# Patient Record
Sex: FEMALE | Race: WHITE | NOT HISPANIC OR LATINO | Employment: FULL TIME | URBAN - METROPOLITAN AREA
[De-identification: names, ages, dates, MRNs, and addresses within clinical notes are randomized per-mention and may not be internally consistent; named-entity substitution may affect disease eponyms.]

---

## 2017-02-15 ENCOUNTER — HISTORICAL (OUTPATIENT)
Dept: LAB | Facility: HOSPITAL | Age: 22
End: 2017-02-15

## 2017-03-21 ENCOUNTER — HISTORICAL (OUTPATIENT)
Dept: LAB | Facility: HOSPITAL | Age: 22
End: 2017-03-21

## 2018-09-11 ENCOUNTER — HISTORICAL (OUTPATIENT)
Dept: ADMINISTRATIVE | Facility: HOSPITAL | Age: 23
End: 2018-09-11

## 2018-09-11 LAB — DEPRECATED CALCIDIOL+CALCIFEROL SERPL-MC: 35.5 NG/ML (ref 30–80)

## 2020-01-02 ENCOUNTER — HISTORICAL (OUTPATIENT)
Dept: ADMINISTRATIVE | Facility: HOSPITAL | Age: 25
End: 2020-01-02

## 2020-01-02 ENCOUNTER — HISTORICAL (OUTPATIENT)
Dept: LAB | Facility: HOSPITAL | Age: 25
End: 2020-01-02

## 2020-01-02 LAB
ABS NEUT (OLG): 5.9 X10(3)/MCL (ref 2.1–9.2)
ALBUMIN SERPL-MCNC: 4.2 GM/DL (ref 3.4–5)
ALBUMIN/GLOB SERPL: 1.62 {RATIO} (ref 1.5–2.5)
ALP SERPL-CCNC: 64 UNIT/L (ref 38–126)
ALT SERPL-CCNC: 11 UNIT/L (ref 7–52)
AST SERPL-CCNC: 17 UNIT/L (ref 15–37)
BILIRUB SERPL-MCNC: 0.3 MG/DL (ref 0.2–1)
BILIRUBIN DIRECT+TOT PNL SERPL-MCNC: 0 MG/DL (ref 0–0.5)
BILIRUBIN DIRECT+TOT PNL SERPL-MCNC: 0.3 MG/DL
BUN SERPL-MCNC: 10 MG/DL (ref 7–18)
CALCIUM SERPL-MCNC: 9.1 MG/DL (ref 8.5–10)
CHLORIDE SERPL-SCNC: 101 MMOL/L (ref 98–107)
CO2 SERPL-SCNC: 27 MMOL/L (ref 21–32)
CREAT SERPL-MCNC: 0.7 MG/DL (ref 0.6–1.3)
ERYTHROCYTE [DISTWIDTH] IN BLOOD BY AUTOMATED COUNT: 12.5 % (ref 11.5–17)
GLOBULIN SER-MCNC: 2.6 GM/DL (ref 1.2–3)
GLUCOSE SERPL-MCNC: 102 MG/DL (ref 74–106)
HBV SURFACE AG SERPL QL IA: NEGATIVE
HCT VFR BLD AUTO: 40.7 % (ref 37–47)
HGB BLD-MCNC: 13.9 GM/DL (ref 12–16)
LYMPHOCYTES # BLD AUTO: 1.8 X10(3)/MCL (ref 0.6–3.4)
LYMPHOCYTES NFR BLD AUTO: 22 % (ref 13–40)
MCH RBC QN AUTO: 31.4 PG (ref 27–31.2)
MCHC RBC AUTO-ENTMCNC: 34 GM/DL (ref 32–36)
MCV RBC AUTO: 92 FL (ref 80–94)
MONOCYTES # BLD AUTO: 0.7 X10(3)/MCL (ref 0.1–1.3)
MONOCYTES NFR BLD AUTO: 7.8 % (ref 0.1–24)
NEUTROPHILS NFR BLD AUTO: 70.2 % (ref 47–80)
PLATELET # BLD AUTO: 312 X10(3)/MCL (ref 130–400)
PMV BLD AUTO: 9.3 FL (ref 9.4–12.4)
POTASSIUM SERPL-SCNC: 4.3 MMOL/L (ref 3.5–5.1)
PROT SERPL-MCNC: 6.8 GM/DL (ref 6.4–8.2)
RBC # BLD AUTO: 4.42 X10(6)/MCL (ref 4.2–5.4)
SODIUM SERPL-SCNC: 136 MMOL/L (ref 136–145)
TSH SERPL-ACNC: 0.75 MIU/ML (ref 0.35–4.94)
WBC # SPEC AUTO: 8.4 X10(3)/MCL (ref 4.5–11.5)

## 2021-09-29 ENCOUNTER — HISTORICAL (OUTPATIENT)
Dept: ADMINISTRATIVE | Facility: HOSPITAL | Age: 26
End: 2021-09-29

## 2022-04-11 ENCOUNTER — HISTORICAL (OUTPATIENT)
Dept: ADMINISTRATIVE | Facility: HOSPITAL | Age: 27
End: 2022-04-11

## 2022-04-29 VITALS
BODY MASS INDEX: 39.88 KG/M2 | SYSTOLIC BLOOD PRESSURE: 118 MMHG | WEIGHT: 225.06 LBS | HEIGHT: 63 IN | DIASTOLIC BLOOD PRESSURE: 88 MMHG

## 2022-08-17 ENCOUNTER — HOSPITAL ENCOUNTER (EMERGENCY)
Facility: HOSPITAL | Age: 27
Discharge: HOME | End: 2022-08-17
Attending: INTERNAL MEDICINE
Payer: COMMERCIAL

## 2022-08-17 VITALS
TEMPERATURE: 98 F | DIASTOLIC BLOOD PRESSURE: 85 MMHG | HEIGHT: 64 IN | SYSTOLIC BLOOD PRESSURE: 129 MMHG | HEART RATE: 92 BPM | OXYGEN SATURATION: 100 % | WEIGHT: 220 LBS | BODY MASS INDEX: 37.56 KG/M2 | RESPIRATION RATE: 18 BRPM

## 2022-08-17 DIAGNOSIS — R55 VASOVAGAL SYNCOPE: Primary | ICD-10-CM

## 2022-08-17 DIAGNOSIS — R11.2 NON-INTRACTABLE VOMITING WITH NAUSEA, UNSPECIFIED VOMITING TYPE: ICD-10-CM

## 2022-08-17 DIAGNOSIS — R07.9 CHEST PAIN: ICD-10-CM

## 2022-08-17 LAB
ALBUMIN SERPL-MCNC: 3.6 GM/DL (ref 3.5–5)
ALBUMIN/GLOB SERPL: 1.2 RATIO (ref 1.1–2)
ALP SERPL-CCNC: 68 UNIT/L (ref 40–150)
ALT SERPL-CCNC: 15 UNIT/L (ref 0–55)
APPEARANCE UR: CLEAR
AST SERPL-CCNC: 17 UNIT/L (ref 5–34)
B-HCG SERPL QL: NEGATIVE
BASOPHILS # BLD AUTO: 0.05 X10(3)/MCL (ref 0–0.2)
BASOPHILS NFR BLD AUTO: 0.5 %
BILIRUB UR QL STRIP.AUTO: NEGATIVE MG/DL
BILIRUBIN DIRECT+TOT PNL SERPL-MCNC: 0.4 MG/DL
BNP BLD-MCNC: 33.1 PG/ML
BUN SERPL-MCNC: 9.5 MG/DL (ref 7–18.7)
CALCIUM SERPL-MCNC: 9 MG/DL (ref 8.4–10.2)
CHLORIDE SERPL-SCNC: 109 MMOL/L (ref 98–107)
CO2 SERPL-SCNC: 24 MMOL/L (ref 22–29)
COLOR UR AUTO: YELLOW
CREAT SERPL-MCNC: 0.65 MG/DL (ref 0.55–1.02)
EOSINOPHIL # BLD AUTO: 0.09 X10(3)/MCL (ref 0–0.9)
EOSINOPHIL NFR BLD AUTO: 0.9 %
ERYTHROCYTE [DISTWIDTH] IN BLOOD BY AUTOMATED COUNT: 13 % (ref 11.5–17)
GFR SERPLBLD CREATININE-BSD FMLA CKD-EPI: >60 MLS/MIN/1.73/M2
GLOBULIN SER-MCNC: 2.9 GM/DL (ref 2.4–3.5)
GLUCOSE SERPL-MCNC: 78 MG/DL (ref 74–100)
GLUCOSE UR QL STRIP.AUTO: NEGATIVE MG/DL
HCT VFR BLD AUTO: 38.3 % (ref 37–47)
HGB BLD-MCNC: 12.4 GM/DL (ref 12–16)
IMM GRANULOCYTES # BLD AUTO: 0.04 X10(3)/MCL (ref 0–0.04)
IMM GRANULOCYTES NFR BLD AUTO: 0.4 %
INR BLD: 1.08 (ref 2–3)
KETONES UR QL STRIP.AUTO: NEGATIVE MG/DL
LEUKOCYTE ESTERASE UR QL STRIP.AUTO: NEGATIVE UNIT/L
LIPASE SERPL-CCNC: 33 U/L
LYMPHOCYTES # BLD AUTO: 2.86 X10(3)/MCL (ref 0.6–4.6)
LYMPHOCYTES NFR BLD AUTO: 27.3 %
MAGNESIUM SERPL-MCNC: 2 MG/DL (ref 1.6–2.6)
MCH RBC QN AUTO: 29.7 PG (ref 27–31)
MCHC RBC AUTO-ENTMCNC: 32.4 MG/DL (ref 33–36)
MCV RBC AUTO: 91.8 FL (ref 80–94)
MONOCYTES # BLD AUTO: 0.72 X10(3)/MCL (ref 0.1–1.3)
MONOCYTES NFR BLD AUTO: 6.9 %
NEUTROPHILS # BLD AUTO: 6.7 X10(3)/MCL (ref 2.1–9.2)
NEUTROPHILS NFR BLD AUTO: 64 %
NITRITE UR QL STRIP.AUTO: NEGATIVE
NRBC BLD AUTO-RTO: 0 %
PH UR STRIP.AUTO: 7 [PH]
PLATELET # BLD AUTO: 305 X10(3)/MCL (ref 130–400)
PMV BLD AUTO: 10.3 FL (ref 7.4–10.4)
POTASSIUM SERPL-SCNC: 3.8 MMOL/L (ref 3.5–5.1)
PROT SERPL-MCNC: 6.5 GM/DL (ref 6.4–8.3)
PROT UR QL STRIP.AUTO: NEGATIVE MG/DL
PROTHROMBIN TIME: 13.8 SECONDS (ref 11.7–14.5)
RBC # BLD AUTO: 4.17 X10(6)/MCL (ref 4.2–5.4)
RBC UR QL AUTO: NEGATIVE UNIT/L
SODIUM SERPL-SCNC: 142 MMOL/L (ref 136–145)
SP GR UR STRIP.AUTO: 1.02
TROPONIN I SERPL-MCNC: <0.01 NG/ML (ref 0–0.04)
UROBILINOGEN UR STRIP-ACNC: 0.2 MG/DL
WBC # SPEC AUTO: 10.5 X10(3)/MCL (ref 4.5–11.5)

## 2022-08-17 PROCEDURE — 96374 THER/PROPH/DIAG INJ IV PUSH: CPT

## 2022-08-17 PROCEDURE — 93005 ELECTROCARDIOGRAM TRACING: CPT

## 2022-08-17 PROCEDURE — 93010 EKG 12-LEAD: ICD-10-PCS | Mod: ,,, | Performed by: INTERNAL MEDICINE

## 2022-08-17 PROCEDURE — 99284 EMERGENCY DEPT VISIT MOD MDM: CPT | Mod: 25

## 2022-08-17 PROCEDURE — 85610 PROTHROMBIN TIME: CPT | Performed by: INTERNAL MEDICINE

## 2022-08-17 PROCEDURE — 83880 ASSAY OF NATRIURETIC PEPTIDE: CPT | Performed by: INTERNAL MEDICINE

## 2022-08-17 PROCEDURE — 83690 ASSAY OF LIPASE: CPT | Performed by: INTERNAL MEDICINE

## 2022-08-17 PROCEDURE — 36415 COLL VENOUS BLD VENIPUNCTURE: CPT | Performed by: INTERNAL MEDICINE

## 2022-08-17 PROCEDURE — 93010 ELECTROCARDIOGRAM REPORT: CPT | Mod: ,,, | Performed by: INTERNAL MEDICINE

## 2022-08-17 PROCEDURE — 81003 URINALYSIS AUTO W/O SCOPE: CPT | Performed by: INTERNAL MEDICINE

## 2022-08-17 PROCEDURE — 85025 COMPLETE CBC W/AUTO DIFF WBC: CPT | Performed by: INTERNAL MEDICINE

## 2022-08-17 PROCEDURE — 63600175 PHARM REV CODE 636 W HCPCS: Performed by: INTERNAL MEDICINE

## 2022-08-17 PROCEDURE — 81025 URINE PREGNANCY TEST: CPT | Performed by: INTERNAL MEDICINE

## 2022-08-17 PROCEDURE — 84484 ASSAY OF TROPONIN QUANT: CPT | Performed by: INTERNAL MEDICINE

## 2022-08-17 PROCEDURE — 80053 COMPREHEN METABOLIC PANEL: CPT | Performed by: INTERNAL MEDICINE

## 2022-08-17 PROCEDURE — 83735 ASSAY OF MAGNESIUM: CPT | Performed by: INTERNAL MEDICINE

## 2022-08-17 RX ORDER — PROMETHAZINE HYDROCHLORIDE 25 MG/1
25 TABLET ORAL EVERY 6 HOURS PRN
Qty: 15 TABLET | Refills: 0 | Status: SHIPPED | OUTPATIENT
Start: 2022-08-17 | End: 2022-08-24

## 2022-08-17 RX ORDER — PROCHLORPERAZINE EDISYLATE 5 MG/ML
5 INJECTION INTRAMUSCULAR; INTRAVENOUS ONCE
Status: COMPLETED | OUTPATIENT
Start: 2022-08-17 | End: 2022-08-17

## 2022-08-17 RX ORDER — SODIUM CHLORIDE, SODIUM LACTATE, POTASSIUM CHLORIDE, CALCIUM CHLORIDE 600; 310; 30; 20 MG/100ML; MG/100ML; MG/100ML; MG/100ML
1000 INJECTION, SOLUTION INTRAVENOUS CONTINUOUS
Status: DISCONTINUED | OUTPATIENT
Start: 2022-08-17 | End: 2022-08-17 | Stop reason: HOSPADM

## 2022-08-17 RX ORDER — ONDANSETRON 4 MG/1
4 TABLET, ORALLY DISINTEGRATING ORAL EVERY 6 HOURS PRN
Qty: 15 TABLET | Refills: 0 | Status: SHIPPED | OUTPATIENT
Start: 2022-08-17 | End: 2023-03-28

## 2022-08-17 RX ADMIN — SODIUM CHLORIDE, POTASSIUM CHLORIDE, SODIUM LACTATE AND CALCIUM CHLORIDE 1000 ML: 600; 310; 30; 20 INJECTION, SOLUTION INTRAVENOUS at 02:08

## 2022-08-17 RX ADMIN — PROCHLORPERAZINE EDISYLATE 5 MG: 5 INJECTION INTRAMUSCULAR; INTRAVENOUS at 02:08

## 2022-08-17 NOTE — ED PROVIDER NOTES
"     Source of History:  Patient, no limitations    Chief complaint:  Loss of Consciousness, Nausea, and Vomiting      HPI:  Palma Nelson is a 27 y.o. female presenting with Loss of Consciousness, Nausea, and Vomiting         Patient complains of syncope. Onset was a few minutes ago, with resolved course since that time. Patient was moving from sitting to standing position at time of onset. The duration of the episode few minutes. Patient denies Chest pain and seizure activity. Symptoms are exacerbated by nothing. Symptoms are relieved by nothing. Associated symptoms include N/V. The patient denies focal sensory loss.      Review of Systems   Constitutional symptoms:  Negative except as documented in HPI.   Skin symptoms:  Negative except as documented in HPI.   HEENT symptoms:  Negative except as documented in HPI.   Respiratory symptoms:  Negative except as documented in HPI.   Cardiovascular symptoms:  Negative except as documented in HPI.   Gastrointestinal symptoms:  Negative except as documented in HPI.    Genitourinary symptoms:  Negative except as documented in HPI.   Musculoskeletal symptoms:  Negative except as documented in HPI.   Neurologic symptoms:  Negative except as documented in HPI.   Psychiatric symptoms:  Negative except as documented in HPI.   Allergy/immunologic symptoms:  Negative except as documented in HPI.             Additional review of systems information: All other systems reviewed and otherwise negative.      Review of patient's allergies indicates:  No Known Allergies    PMH:  As per HPI and below:    No past medical history on file.     No family history on file.    No past surgical history on file.         There is no problem list on file for this patient.       Physical Exam:    /85 (BP Location: Left arm, Patient Position: Lying)   Pulse 92   Temp 98.4 °F (36.9 °C) (Oral)   Resp 18   Ht 5' 4" (1.626 m)   Wt 99.8 kg (220 lb)   SpO2 100%   BMI 37.76 kg/m² "     Nursing note and vital signs reviewed.    General:  Alert, no acute distress.   Skin: Normal for Ethnic Origin, No cyanosis  HEENT: Normocephalic and atraumatic, Vision unchanged, Pupils symmetric, No icterus , Nasal mucosa is pink and moist  Cardiovascular:  Regular rate and rhythm, No edema  Chest Wall: No deformity, equal chest rise  Respiratory:  Lungs are clear to auscultation, respirations are non-labored.    Musculoskeletal:  No deformity, Normal perfusion to all extremities  Back: No bony tenderness  : No suprapubic pain, No CVA tenderness  Gastrointestinal:  Soft, Nontender, Non distended, Normal bowel sounds.    Neurological:  Alert and oriented to person, place, time, and situation, normal motor observed, normal speech observed.    Psychiatric:  Cooperative, appropriate mood & affect.        Labs that have been ordered have been independently reviewed and interpreted by myself.     Old Chart Reviewed.      Initial Impression/ Differential Dx:  Arrhythmia, situational, vasovagal, cerebrovascular, neurogenic, psychogenic, drug-induced, autonomic failure, dehydration.     Considered but not suspected at this time:  Obstructive cardiomyopathy, structural cardiac disease, aortic dissection, PE, pulmonary hypertension, carotid sinus syndrome      MDM:      Reviewed Nurses Note.    Reviewed Pertinent old records.    Orders Placed This Encounter    Pulse Oximeter    CBC Auto Differential    Comprehensive Metabolic Panel    Protime-INR    Lipase    Troponin I    BNP    Urinalysis, Reflex to Urine Culture Urine, Clean Catch    Pregnancy, urine rapid    Magnesium    CBC with Differential    Cardiac Monitoring - Adult    EKG 12-lead    Insert peripheral IV    lactated ringers infusion    prochlorperazine injection Soln 5 mg    ondansetron (ZOFRAN-ODT) 4 MG TbDL    promethazine (PHENERGAN) 25 MG tablet                    Labs Reviewed   COMPREHENSIVE METABOLIC PANEL - Abnormal; Notable for the  following components:       Result Value    Chloride 109 (*)     All other components within normal limits   PROTIME-INR - Abnormal; Notable for the following components:    INR 1.08 (*)     All other components within normal limits   CBC WITH DIFFERENTIAL - Abnormal; Notable for the following components:    RBC 4.17 (*)     MCHC 32.4 (*)     All other components within normal limits   LIPASE - Normal   TROPONIN I - Normal   PREGNANCY TEST, URINE RAPID - Normal   MAGNESIUM - Normal   CBC W/ AUTO DIFFERENTIAL    Narrative:     The following orders were created for panel order CBC Auto Differential.  Procedure                               Abnormality         Status                     ---------                               -----------         ------                     CBC with Differential[[id number]]        Abnormal            Final result                 Please view results for these tests on the individual orders.   URINALYSIS, REFLEX TO URINE CULTURE   B-TYPE NATRIURETIC PEPTIDE          No orders to display        Admission on 08/17/2022   Component Date Value Ref Range Status    Sodium Level 08/17/2022 142  136 - 145 mmol/L Final    Potassium Level 08/17/2022 3.8  3.5 - 5.1 mmol/L Final    Chloride 08/17/2022 109 (A) 98 - 107 mmol/L Final    Carbon Dioxide 08/17/2022 24  22 - 29 mmol/L Final    Glucose Level 08/17/2022 78  74 - 100 mg/dL Final    Blood Urea Nitrogen 08/17/2022 9.5  7.0 - 18.7 mg/dL Final    Creatinine 08/17/2022 0.65  0.55 - 1.02 mg/dL Final    Calcium Level Total 08/17/2022 9.0  8.4 - 10.2 mg/dL Final    Protein Total 08/17/2022 6.5  6.4 - 8.3 gm/dL Final    Albumin Level 08/17/2022 3.6  3.5 - 5.0 gm/dL Final    Globulin 08/17/2022 2.9  2.4 - 3.5 gm/dL Final    Albumin/Globulin Ratio 08/17/2022 1.2  1.1 - 2.0 ratio Final    Bilirubin Total 08/17/2022 0.4  <=1.5 mg/dL Final    Alkaline Phosphatase 08/17/2022 68  40 - 150 unit/L Final    Alanine Aminotransferase 08/17/2022 15  0  - 55 unit/L Final    Aspartate Aminotransferase 08/17/2022 17  5 - 34 unit/L Final    eGFR 08/17/2022 >60  mls/min/1.73/m2 Final    PT 08/17/2022 13.8  11.7 - 14.5 seconds Final    INR 08/17/2022 1.08 (A) 2.00 - 3.00 Final    Lipase Level 08/17/2022 33  <=60 U/L Final    Troponin-I 08/17/2022 <0.010  0.000 - 0.045 ng/mL Final    Color, UA 08/17/2022 Yellow  Yellow, Colorless, Other, Clear Final    Appearance, UA 08/17/2022 Clear  Clear Final    Specific Gravity, UA 08/17/2022 1.025   Final    pH, UA 08/17/2022 7.0  5.0, 5.5, 6.0, 6.5, 7.0, 7.5, 8.0, 8.5 Final    Protein, UA 08/17/2022 Negative  Negative, 300  mg/dL Final    Glucose, UA 08/17/2022 Negative  Negative, Normal mg/dL Final    Ketones, UA 08/17/2022 Negative  Negative, +1, +2, +3, +4, +5, >=160, >=80 mg/dL Final    Blood, UA 08/17/2022 Negative  Negative unit/L Final    Bilirubin, UA 08/17/2022 Negative  Negative mg/dL Final    Urobilinogen, UA 08/17/2022 0.2  0.2, 1.0, Normal mg/dL Final    Nitrites, UA 08/17/2022 Negative  Negative Final    Leukocyte Esterase, UA 08/17/2022 Negative  Negative, 75  unit/L Final    Beta hCG Qualitative, Urine 08/17/2022 Negative  Negative Final    Magnesium Level 08/17/2022 2.00  1.60 - 2.60 mg/dL Final    WBC 08/17/2022 10.5  4.5 - 11.5 x10(3)/mcL Final    RBC 08/17/2022 4.17 (A) 4.20 - 5.40 x10(6)/mcL Final    Hgb 08/17/2022 12.4  12.0 - 16.0 gm/dL Final    Hct 08/17/2022 38.3  37.0 - 47.0 % Final    MCV 08/17/2022 91.8  80.0 - 94.0 fL Final    MCH 08/17/2022 29.7  27.0 - 31.0 pg Final    MCHC 08/17/2022 32.4 (A) 33.0 - 36.0 mg/dL Final    RDW 08/17/2022 13.0  11.5 - 17.0 % Final    Platelet 08/17/2022 305  130 - 400 x10(3)/mcL Final    MPV 08/17/2022 10.3  7.4 - 10.4 fL Final    Neut % 08/17/2022 64.0  % Final    Lymph % 08/17/2022 27.3  % Final    Mono % 08/17/2022 6.9  % Final    Eos % 08/17/2022 0.9  % Final    Basophil % 08/17/2022 0.5  % Final    Lymph # 08/17/2022 2.86  0.6 -  4.6 x10(3)/mcL Final    Neut # 08/17/2022 6.7  2.1 - 9.2 x10(3)/mcL Final    Mono # 08/17/2022 0.72  0.1 - 1.3 x10(3)/mcL Final    Eos # 08/17/2022 0.09  0 - 0.9 x10(3)/mcL Final    Baso # 08/17/2022 0.05  0 - 0.2 x10(3)/mcL Final    IG# 08/17/2022 0.04  0 - 0.04 x10(3)/mcL Final    IG% 08/17/2022 0.4  % Final    NRBC% 08/17/2022 0.0  % Final       Imaging Results    None           ECG Results          EKG 12-lead (Preliminary result)  Result time 08/17/22 01:52:52    ED Interpretation by Rohan Munoz DO (08/17/22 01:52:52, [facility] - Emergency Dept, Emergency Medicine)    Independent ECG Interpretation:    NSR at rate of 67. Normal intervals. Normal QRS. No acute ST or T wave abnormalities. Overall impression: No evidence of acute ischemia or arrhythmia.                                                               Diagnostic Impression:    1. Vasovagal syncope    2. Chest pain    3. Non-intractable vomiting with nausea, unspecified vomiting type         ED Disposition Condition    Discharge Stable           Follow-up Information     [facility] - Emergency Dept.    Specialty: Emergency Medicine  Why: If symptoms worsen  Contact information:  [address]  [facility] [address] [phone]           Call  Tex Bauman MD.    Specialty: Family Medicine  Contact information:  [address] [place] [id number] [phone]                          ED Prescriptions     Medication Sig Dispense Start Date End Date Auth. Provider    ondansetron (ZOFRAN-ODT) 4 MG TbDL Take 1 tablet (4 mg total) by mouth every 6 (six) hours as needed (nausea). 15 tablet 8/17/2022  Rohan Munoz DO    promethazine (PHENERGAN) 25 MG tablet Take 1 tablet (25 mg total) by mouth every 6 (six) hours as needed for Nausea. 15 tablet 8/17/2022 8/24/2022 Rohan B Alexander, DO        Follow-up Information     Follow up With Specialties Details Why  Contact Info    [facility] - Emergency Dept Emergency Medicine  If symptoms worsen [id number]  [facility] [address] [phone]    Tex Bauman MD Family Medicine Call   [address] [facility].  [facility] [address] [phone]             Rohan Munoz,   08/17/22 0238

## 2022-09-28 PROBLEM — J30.2 SEASONAL ALLERGIC RHINITIS: Status: ACTIVE | Noted: 2022-09-28

## 2022-09-28 PROBLEM — R79.89 LOW VITAMIN D LEVEL: Status: ACTIVE | Noted: 2022-09-28

## 2022-09-28 PROBLEM — F41.9 ANXIETY: Status: ACTIVE | Noted: 2022-09-28

## 2023-02-25 ENCOUNTER — OFFICE VISIT (OUTPATIENT)
Dept: URGENT CARE | Facility: CLINIC | Age: 28
End: 2023-02-25
Payer: COMMERCIAL

## 2023-02-25 VITALS
OXYGEN SATURATION: 97 % | WEIGHT: 215 LBS | SYSTOLIC BLOOD PRESSURE: 123 MMHG | BODY MASS INDEX: 36.7 KG/M2 | DIASTOLIC BLOOD PRESSURE: 84 MMHG | HEART RATE: 69 BPM | RESPIRATION RATE: 20 BRPM | HEIGHT: 64 IN | TEMPERATURE: 98 F

## 2023-02-25 DIAGNOSIS — S63.501A SPRAIN OF RIGHT WRIST, INITIAL ENCOUNTER: Primary | ICD-10-CM

## 2023-02-25 DIAGNOSIS — M25.531 RIGHT WRIST PAIN: ICD-10-CM

## 2023-02-25 PROCEDURE — 3008F PR BODY MASS INDEX (BMI) DOCUMENTED: ICD-10-PCS | Mod: CPTII,,, | Performed by: FAMILY MEDICINE

## 2023-02-25 PROCEDURE — 3079F PR MOST RECENT DIASTOLIC BLOOD PRESSURE 80-89 MM HG: ICD-10-PCS | Mod: CPTII,,, | Performed by: FAMILY MEDICINE

## 2023-02-25 PROCEDURE — 99214 PR OFFICE/OUTPT VISIT, EST, LEVL IV, 30-39 MIN: ICD-10-PCS | Mod: ,,, | Performed by: FAMILY MEDICINE

## 2023-02-25 PROCEDURE — 3074F PR MOST RECENT SYSTOLIC BLOOD PRESSURE < 130 MM HG: ICD-10-PCS | Mod: CPTII,,, | Performed by: FAMILY MEDICINE

## 2023-02-25 PROCEDURE — 1159F PR MEDICATION LIST DOCUMENTED IN MEDICAL RECORD: ICD-10-PCS | Mod: CPTII,,, | Performed by: FAMILY MEDICINE

## 2023-02-25 PROCEDURE — 99214 OFFICE O/P EST MOD 30 MIN: CPT | Mod: ,,, | Performed by: FAMILY MEDICINE

## 2023-02-25 PROCEDURE — 1159F MED LIST DOCD IN RCRD: CPT | Mod: CPTII,,, | Performed by: FAMILY MEDICINE

## 2023-02-25 PROCEDURE — 1160F RVW MEDS BY RX/DR IN RCRD: CPT | Mod: CPTII,,, | Performed by: FAMILY MEDICINE

## 2023-02-25 PROCEDURE — 3079F DIAST BP 80-89 MM HG: CPT | Mod: CPTII,,, | Performed by: FAMILY MEDICINE

## 2023-02-25 PROCEDURE — 3074F SYST BP LT 130 MM HG: CPT | Mod: CPTII,,, | Performed by: FAMILY MEDICINE

## 2023-02-25 PROCEDURE — 1160F PR REVIEW ALL MEDS BY PRESCRIBER/CLIN PHARMACIST DOCUMENTED: ICD-10-PCS | Mod: CPTII,,, | Performed by: FAMILY MEDICINE

## 2023-02-25 PROCEDURE — 3008F BODY MASS INDEX DOCD: CPT | Mod: CPTII,,, | Performed by: FAMILY MEDICINE

## 2023-02-25 RX ORDER — SERTRALINE HYDROCHLORIDE 50 MG/1
50 TABLET, FILM COATED ORAL
COMMUNITY
Start: 2022-09-01 | End: 2023-06-22

## 2023-02-25 RX ORDER — BETAXOLOL 10 MG/1
10 TABLET, FILM COATED ORAL DAILY
COMMUNITY
Start: 2023-01-29

## 2023-02-25 RX ORDER — FLUDROCORTISONE ACETATE 0.1 MG/1
2 TABLET ORAL
COMMUNITY
Start: 2022-09-01 | End: 2024-02-05

## 2023-02-25 RX ORDER — ONDANSETRON HYDROCHLORIDE 8 MG/1
TABLET, FILM COATED ORAL
COMMUNITY
Start: 2022-11-29 | End: 2023-04-04 | Stop reason: SDUPTHER

## 2023-02-25 NOTE — PATIENT INSTRUCTIONS
Splint while at work, ice in the evenings, ibuprofen with food to help with inflammation and pain.   Final X ray also reported as non concerning for fracture.

## 2023-02-25 NOTE — PROGRESS NOTES
"Subjective:       Patient ID: Palma Nelson is a 27 y.o. female.    Vitals:  height is 5' 4" (1.626 m) and weight is 97.5 kg (215 lb). Her temperature is 98.1 °F (36.7 °C). Her blood pressure is 123/84 and her pulse is 69. Her respiration is 20 and oxygen saturation is 97%.     Chief Complaint: Injury (Right wrist pain, cant put pressure, unaware of any injury, happened ob 02/18 )    7 days of R wrist pain.  Difficulty applying pressure to it.  No known trauma.  L hand dominant.       Constitution: Negative for fever.   Musculoskeletal:  Positive for pain, joint swelling and abnormal ROM of joint. Negative for trauma.   Skin:  Negative for rash and erythema.   Neurological:  Negative for dizziness.     Objective:      Physical Exam   HENT:   Mouth/Throat: Mucous membranes are moist.   Cardiovascular: Normal rate.   Pulmonary/Chest: Effort normal.   Abdominal: Normal appearance.   Musculoskeletal:      Comments: R hand  intact, pos TTP over anatomic snuff box, otherwise no TTP of the R hand, forearm, or wrist.     Neurological: no focal deficit. She is alert.   Skin: Capillary refill takes less than 2 seconds. No bruising and No erythema   Psychiatric: Mood normal.   Nursing note and vitals reviewed.      Assessment:       1. Sprain of right wrist, initial encounter    2. Right wrist pain          Plan:         Sprain of right wrist, initial encounter    Right wrist pain  -     XR WRIST COMPLETE 3 VIEWS RIGHT; Future; Expected date: 02/25/2023            Xray of the R wrist done today, per my review no prominent fracture or dislocations.   Will also call with final report.          "

## 2023-03-09 ENCOUNTER — HOSPITAL ENCOUNTER (EMERGENCY)
Facility: HOSPITAL | Age: 28
Discharge: HOME | End: 2023-03-09
Attending: EMERGENCY MEDICINE
Payer: COMMERCIAL

## 2023-03-09 VITALS
WEIGHT: 210 LBS | RESPIRATION RATE: 18 BRPM | SYSTOLIC BLOOD PRESSURE: 137 MMHG | HEIGHT: 65 IN | DIASTOLIC BLOOD PRESSURE: 72 MMHG | BODY MASS INDEX: 34.99 KG/M2 | OXYGEN SATURATION: 100 % | TEMPERATURE: 99 F | HEART RATE: 59 BPM

## 2023-03-09 DIAGNOSIS — R55 SYNCOPE: ICD-10-CM

## 2023-03-09 DIAGNOSIS — R11.2 NAUSEA AND VOMITING, UNSPECIFIED VOMITING TYPE: Primary | ICD-10-CM

## 2023-03-09 LAB
ALBUMIN SERPL-MCNC: 3.7 G/DL (ref 3.5–5)
ALBUMIN/GLOB SERPL: 1.2 RATIO (ref 1.1–2)
ALP SERPL-CCNC: 67 UNIT/L (ref 40–150)
ALT SERPL-CCNC: 14 UNIT/L (ref 0–55)
APPEARANCE UR: CLEAR
AST SERPL-CCNC: 15 UNIT/L (ref 5–34)
B-HCG SERPL QL: NEGATIVE
BACTERIA #/AREA URNS AUTO: NORMAL /HPF
BASOPHILS # BLD AUTO: 0.04 X10(3)/MCL (ref 0–0.2)
BASOPHILS NFR BLD AUTO: 0.5 %
BILIRUB UR QL STRIP.AUTO: NEGATIVE MG/DL
BILIRUBIN DIRECT+TOT PNL SERPL-MCNC: 0.5 MG/DL
BUN SERPL-MCNC: 9.6 MG/DL (ref 7–18.7)
CALCIUM SERPL-MCNC: 9 MG/DL (ref 8.4–10.2)
CHLORIDE SERPL-SCNC: 107 MMOL/L (ref 98–107)
CO2 SERPL-SCNC: 25 MMOL/L (ref 22–29)
COLOR UR AUTO: YELLOW
CREAT SERPL-MCNC: 0.67 MG/DL (ref 0.55–1.02)
EOSINOPHIL # BLD AUTO: 0.03 X10(3)/MCL (ref 0–0.9)
EOSINOPHIL NFR BLD AUTO: 0.4 %
ERYTHROCYTE [DISTWIDTH] IN BLOOD BY AUTOMATED COUNT: 13.2 % (ref 11.5–17)
GFR SERPLBLD CREATININE-BSD FMLA CKD-EPI: >60 MLS/MIN/1.73/M2
GLOBULIN SER-MCNC: 3.2 GM/DL (ref 2.4–3.5)
GLUCOSE SERPL-MCNC: 83 MG/DL (ref 74–100)
GLUCOSE UR QL STRIP.AUTO: NEGATIVE MG/DL
HCT VFR BLD AUTO: 38.6 % (ref 37–47)
HGB BLD-MCNC: 12.6 G/DL (ref 12–16)
IMM GRANULOCYTES # BLD AUTO: 0.03 X10(3)/MCL (ref 0–0.04)
IMM GRANULOCYTES NFR BLD AUTO: 0.4 %
KETONES UR QL STRIP.AUTO: NEGATIVE MG/DL
LEUKOCYTE ESTERASE UR QL STRIP.AUTO: NEGATIVE UNIT/L
LYMPHOCYTES # BLD AUTO: 1.55 X10(3)/MCL (ref 0.6–4.6)
LYMPHOCYTES NFR BLD AUTO: 20.7 %
MCH RBC QN AUTO: 29.8 PG
MCHC RBC AUTO-ENTMCNC: 32.6 G/DL (ref 33–36)
MCV RBC AUTO: 91.3 FL (ref 80–94)
MONOCYTES # BLD AUTO: 0.64 X10(3)/MCL (ref 0.1–1.3)
MONOCYTES NFR BLD AUTO: 8.5 %
NEUTROPHILS # BLD AUTO: 5.2 X10(3)/MCL (ref 2.1–9.2)
NEUTROPHILS NFR BLD AUTO: 69.5 %
NITRITE UR QL STRIP.AUTO: NEGATIVE
NRBC BLD AUTO-RTO: 0 %
PH UR STRIP.AUTO: 7.5 [PH]
PLATELET # BLD AUTO: 310 X10(3)/MCL (ref 130–400)
PMV BLD AUTO: 10.4 FL (ref 7.4–10.4)
POTASSIUM SERPL-SCNC: 3.8 MMOL/L (ref 3.5–5.1)
PROT SERPL-MCNC: 6.9 GM/DL (ref 6.4–8.3)
PROT UR QL STRIP.AUTO: NEGATIVE MG/DL
RBC # BLD AUTO: 4.23 X10(6)/MCL (ref 4.2–5.4)
RBC #/AREA URNS AUTO: <5 /HPF
RBC UR QL AUTO: NEGATIVE UNIT/L
SODIUM SERPL-SCNC: 139 MMOL/L (ref 136–145)
SP GR UR STRIP.AUTO: 1.02 (ref 1–1.03)
SQUAMOUS #/AREA URNS AUTO: <5 /HPF
UROBILINOGEN UR STRIP-ACNC: 1 MG/DL
WBC # SPEC AUTO: 7.5 X10(3)/MCL (ref 4.5–11.5)
WBC #/AREA URNS AUTO: <5 /HPF

## 2023-03-09 PROCEDURE — 81001 URINALYSIS AUTO W/SCOPE: CPT | Performed by: PHYSICIAN ASSISTANT

## 2023-03-09 PROCEDURE — 85025 COMPLETE CBC W/AUTO DIFF WBC: CPT | Performed by: PHYSICIAN ASSISTANT

## 2023-03-09 PROCEDURE — 63600175 PHARM REV CODE 636 W HCPCS

## 2023-03-09 PROCEDURE — 25000003 PHARM REV CODE 250: Performed by: PHYSICIAN ASSISTANT

## 2023-03-09 PROCEDURE — 93010 ELECTROCARDIOGRAM REPORT: CPT | Mod: ,,, | Performed by: INTERNAL MEDICINE

## 2023-03-09 PROCEDURE — 93005 ELECTROCARDIOGRAM TRACING: CPT

## 2023-03-09 PROCEDURE — 96372 THER/PROPH/DIAG INJ SC/IM: CPT

## 2023-03-09 PROCEDURE — 93010 EKG 12-LEAD: ICD-10-PCS | Mod: ,,, | Performed by: INTERNAL MEDICINE

## 2023-03-09 PROCEDURE — 80053 COMPREHEN METABOLIC PANEL: CPT | Performed by: PHYSICIAN ASSISTANT

## 2023-03-09 PROCEDURE — 96361 HYDRATE IV INFUSION ADD-ON: CPT

## 2023-03-09 PROCEDURE — 99284 EMERGENCY DEPT VISIT MOD MDM: CPT | Mod: 25

## 2023-03-09 PROCEDURE — 96360 HYDRATION IV INFUSION INIT: CPT

## 2023-03-09 PROCEDURE — 81025 URINE PREGNANCY TEST: CPT | Performed by: PHYSICIAN ASSISTANT

## 2023-03-09 RX ORDER — PROMETHAZINE HYDROCHLORIDE 25 MG/ML
25 INJECTION, SOLUTION INTRAMUSCULAR; INTRAVENOUS
Status: COMPLETED | OUTPATIENT
Start: 2023-03-09 | End: 2023-03-09

## 2023-03-09 RX ORDER — PROMETHAZINE HYDROCHLORIDE 25 MG/1
25 TABLET ORAL EVERY 6 HOURS PRN
Qty: 15 TABLET | Refills: 0 | Status: SHIPPED | OUTPATIENT
Start: 2023-03-09 | End: 2024-02-05

## 2023-03-09 RX ADMIN — PROMETHAZINE HYDROCHLORIDE 25 MG: 25 INJECTION INTRAMUSCULAR; INTRAVENOUS at 02:03

## 2023-03-09 RX ADMIN — SODIUM CHLORIDE, POTASSIUM CHLORIDE, SODIUM LACTATE AND CALCIUM CHLORIDE 1000 ML: 600; 310; 30; 20 INJECTION, SOLUTION INTRAVENOUS at 02:03

## 2023-03-09 RX ADMIN — SODIUM CHLORIDE 1000 ML: 9 INJECTION, SOLUTION INTRAVENOUS at 12:03

## 2023-03-09 NOTE — ED PROVIDER NOTES
Encounter Date: 3/9/2023       History     Chief Complaint   Patient presents with    Emesis     N/V/D, syncope at work     The patient is a 27 y.o. female with a history of POTS and vasovagal syncope who presents to the Emergency Department with a chief complaint of syncopal episode.  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  She reports she started to feel nauseated and vomited 1 time.  She stopped at a store where she had a syncopal episode and was brought to the emergency department by EMS.  Symptoms began just prior to arrival and have been improving since onset. Her pain is currently rated as a 0/10 in severity . Associated symptoms include nausea, vomiting, diarrhea. Symptoms are aggravated with nothing and there are no alleviating factors. The patient denies chest pain, shortness of breath, fever, chills, or headache. She reports taking nothing prior to arrival with no relief of symptoms.  Patient reports her only complaint at this time is nausea.  No other reported symptoms at this time.      The history is provided by the patient. No  was used.   Illness   The current episode started just prior to arrival. The problem occurs occasionally. The problem has been rapidly improving. The pain is at a severity of 0/10. Nothing relieves the symptoms. Nothing aggravates the symptoms. Associated symptoms include diarrhea, nausea and vomiting. Pertinent negatives include no fever, no abdominal pain, no congestion, no headaches, no mouth sores, no rhinorrhea, no sore throat, no muscle aches, no neck pain, no cough, no shortness of breath, no URI, no rash and no diaper rash.   Review of patient's allergies indicates:  No Known Allergies  Past Medical History:   Diagnosis Date    Anxiety disorder, unspecified     Low vitamin D level     Obesity, unspecified     POTS (postural orthostatic tachycardia syndrome)     Seasonal allergies     Supraventricular tachycardia      Vasovagal syncope      Past Surgical History:   Procedure Laterality Date    sinoplasty       Family History   Problem Relation Age of Onset    Hypothyroidism Mother     Hypothyroidism Father     Hypertension Father             Kidney cancer Father     No Known Problems Sister     Valvular heart disease Brother      Social History     Tobacco Use    Smoking status: Never    Smokeless tobacco: Never   Substance Use Topics    Alcohol use: Never    Drug use: Never     Review of Systems   Constitutional:  Negative for chills and fever.   HENT:  Negative for congestion, mouth sores, rhinorrhea and sore throat.    Respiratory:  Negative for cough, chest tightness and shortness of breath.    Cardiovascular:  Negative for chest pain, palpitations and leg swelling.   Gastrointestinal:  Positive for diarrhea, nausea and vomiting. Negative for abdominal pain.   Genitourinary:  Negative for dysuria.   Musculoskeletal:  Negative for back pain and neck pain.   Skin:  Negative for rash.   Neurological:  Positive for syncope. Negative for dizziness, weakness, light-headedness, numbness and headaches.   Hematological:  Does not bruise/bleed easily.   All other systems reviewed and are negative.    Physical Exam     Initial Vitals   BP Pulse Resp Temp SpO2   23 1156 23 1156 23 1156 23 1340 23 1156   134/82 66 16 98.5 °F (36.9 °C) 99 %      MAP       --                Physical Exam    Nursing note and vitals reviewed.  Constitutional: She appears well-developed and well-nourished.   HENT:   Head: Normocephalic.   Right Ear: Hearing and tympanic membrane normal.   Left Ear: Hearing and tympanic membrane normal.   Mouth/Throat: Uvula is midline, oropharynx is clear and moist and mucous membranes are normal.   Eyes: Conjunctivae and EOM are normal. Pupils are equal, round, and reactive to light.   Neck: Neck supple.   Normal range of motion.  Cardiovascular:  Normal rate, regular rhythm, S1 normal,  S2 normal, normal heart sounds and normal pulses.           Pulmonary/Chest: Effort normal and breath sounds normal. No respiratory distress. She has no wheezes. She exhibits no tenderness.   Abdominal: Abdomen is soft. Bowel sounds are normal. She exhibits no distension. There is no abdominal tenderness. There is no guarding.   Musculoskeletal:         General: No tenderness or edema. Normal range of motion.      Cervical back: Normal range of motion and neck supple.     Lymphadenopathy:     She has no cervical adenopathy.   Neurological: She is alert and oriented to person, place, and time. She has normal strength. No sensory deficit. GCS score is 15. GCS eye subscore is 4. GCS verbal subscore is 5. GCS motor subscore is 6.   Skin: Skin is warm, dry and intact. Capillary refill takes less than 2 seconds.   Psychiatric: She has a normal mood and affect. Thought content normal.       ED Course   Procedures  Labs Reviewed   CBC WITH DIFFERENTIAL - Abnormal; Notable for the following components:       Result Value    MCHC 32.6 (*)     All other components within normal limits   URINALYSIS, REFLEX TO URINE CULTURE - Normal   PREGNANCY TEST, URINE RAPID - Normal   URINALYSIS, MICROSCOPIC - Normal   CBC W/ AUTO DIFFERENTIAL    Narrative:     The following orders were created for panel order CBC auto differential.  Procedure                               Abnormality         Status                     ---------                               -----------         ------                     CBC with Differential[[id number]]        Abnormal            Final result                 Please view results for these tests on the individual orders.   COMPREHENSIVE METABOLIC PANEL     EKG Readings: (Independently Interpreted)   Rhythm: Normal Sinus Rhythm. Heart Rate: 65. Ectopy: No Ectopy. Conduction: Normal. ST Segments: Normal ST Segments. T Waves: Normal. Clinical Impression: Normal Sinus Rhythm   ECG Results              EKG 12-lead  (Final result)  Result time 03/09/23 13:16:09      Final result by Interface, Lab In [facility] (03/09/23 13:16:09)                   Narrative:    Test Reason : R55,    Vent. Rate : 065 BPM     Atrial Rate : 065 BPM     P-R Int : 134 ms          QRS Dur : 082 ms      QT Int : 438 ms       P-R-T Axes : 075 082 058 degrees     QTc Int : 455 ms    Normal sinus rhythm  Normal ECG  When compared with ECG of 17-AUG-2022 01:48,  No significant change was found  Confirmed by David Collins MD ([id number]) on 3/9/2023 1:15:56 PM    Referred By: [email or web address]   SELF           Confirmed By:David Collins MD                                  Imaging Results    None          Medications   sodium chloride 0.9% bolus 1,000 mL 1,000 mL (0 mLs Intravenous Stopped 3/9/23 1329)   lactated ringers bolus 1,000 mL (0 mLs Intravenous Stopped 3/9/23 1500)   promethazine injection 25 mg (25 mg Intramuscular Given 3/9/23 1400)     Medical Decision Making:   Initial Assessment:   The patient is a 27 y.o. female with a history of POTS and vasovagal syncope who presents to the Emergency Department with a chief complaint of syncopal episode.  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  She reports she started to feel nauseated and vomited 1 time.  She stopped at a store where she had a syncopal episode and was brought to the emergency department by EMS.  Symptoms began just prior to arrival and have been improving since onset. Her pain is currently rated as a 0/10 in severity . Associated symptoms include nausea, vomiting, diarrhea. Symptoms are aggravated with nothing and there are no alleviating factors. The patient denies chest pain, shortness of breath, fever, chills, or headache. She reports taking nothing prior to arrival with no relief of symptoms.  Patient reports her only complaint at this time is nausea.  No other reported symptoms at this time.    Differential Diagnosis:   Electrolyte derangement, vasovagal syncope,  arrhythmia, dehydration, urinary tract infection  Clinical Tests:   Lab Tests: Ordered and Reviewed  Medical Tests: Ordered and Reviewed  ED Management:  Co-morbidities and/or factors adding to the complexity or risk for the patient?: none  Differential diagnoses: Electrolyte derangement, vasovagal syncope, arrhythmia, dehydration, urinary tract infection  Decision to obtain previous or outside records?: I did not obtain previous or outside records.  Chart Review (nursing home, outside records, CareEverywhere): none  Review of RX medications/new RX prescribed by me?: Patient reports she has Zofran at home. Prescribed Phenergan as needed for n/v.   My EKG Interpretation: see above  Labs/imaging/other tests obtained/considered (risk/benefits of testing discussed): cbc, cmp, ua, upt, ekg  Labs/tests intepretation: Labs unremarkable. Informed patient of results.   My independent imaging interpretation: none  Treatment/interventions, IV fluids, IV medications: IV fluids, Zofran, and Phenergan given in ER with improvement of symptoms.   Complex management (IV controlled substances, went to OR, DNR, meds requiring monitoring, transfer, etc)?: none  Workup/treatment affected by social determinants of health?:none   Point of care US done/interpretation: none  Consults/radiologist/EMS/social work/family discussion/alternate history: none  Advanced care planning/end of life discussion: none  Shared decision making: Discussed plan of care and interventions with patient. Agreed to and aware of plan of care. Comfortable being discharged home.   ETOH/smoking/drug cessation discussion: none  Dispo: Patient discharged home. Patient denies new or additional complaints; no further tests indicated at this time. Verbalized understanding of instructions. No emergent or apparent distress noted prior to discharge. To follow up with PCP in 1 week as needed. Strict ER return precautions given.               ED Course as of 03/09/23 1520    Thu Mar 09, 2023   1400 Assumed care of patient.  [JA]   1509 PO challenge successful. Patient reports symptoms have improved since med admin.  [JA]      ED Course User Index  [JA] Bijal Paniagua NP                 Clinical Impression:   Final diagnoses:  [R55] Syncope  [R11.2] Nausea and vomiting, unspecified vomiting type (Primary)        ED Disposition Condition    Discharge Stable          ED Prescriptions       Medication Sig Dispense Start Date End Date Auth. Provider    promethazine (PHENERGAN) 25 MG tablet Take 1 tablet (25 mg total) by mouth every 6 (six) hours as needed for Nausea. 15 tablet 3/9/2023 -- Bijal Paniagua NP          Follow-up Information       Follow up With Specialties Details Why Contact Info    Tex Bauman MD Family Medicine Call in 1 week If symptoms worsen, As needed [address] [facility].  [facility] [address] [phone]      [facility] - Emergency Dept Emergency Medicine Go to  If symptoms worsen, As needed [address] [facility] [id number] [phone]             Bijal Paniagua NP  03/09/23 1524

## 2023-03-09 NOTE — Clinical Note
"Palma VILLASANDOVAL Nelson was seen and treated in our emergency department on 3/9/2023.  She may return to work on 03/13/2023.       If you have any questions or concerns, please don't hesitate to call.      Bijal Paniagua NP"

## 2023-03-09 NOTE — FIRST PROVIDER EVALUATION
"Medical screening examination initiated.  I have conducted a focused provider triage encounter, findings are as follows:    Chief Complaint   Patient presents with    Emesis     N/V/D, syncope at work     Brief history of present illness:  27 y.o. female presents to the ED with syncopal episode at work with associated n/v/d.     Vitals:    03/09/23 1156   BP: 134/82   Pulse: 66   Resp: 16   SpO2: 99%   Weight: 95.3 kg (210 lb)   Height: 5' 5" (1.651 m)     Pertinent physical exam: Awake, alert, ambulatory, non-labored respirations    Brief workup plan:  labs, EKG, orthostatic vitals     Preliminary workup initiated; this workup will be continued and followed by the physician or advanced practice provider that is assigned to the patient when roomed.  "

## 2023-03-30 PROCEDURE — 86803 HEPATITIS C AB TEST: CPT | Performed by: FAMILY MEDICINE

## 2023-07-02 ENCOUNTER — OFFICE VISIT (OUTPATIENT)
Dept: URGENT CARE | Facility: CLINIC | Age: 28
End: 2023-07-02
Payer: COMMERCIAL

## 2023-07-02 VITALS
SYSTOLIC BLOOD PRESSURE: 131 MMHG | HEART RATE: 57 BPM | WEIGHT: 207 LBS | DIASTOLIC BLOOD PRESSURE: 85 MMHG | BODY MASS INDEX: 34.49 KG/M2 | OXYGEN SATURATION: 99 % | RESPIRATION RATE: 18 BRPM | TEMPERATURE: 98 F | HEIGHT: 65 IN

## 2023-07-02 DIAGNOSIS — M25.531 RIGHT WRIST PAIN: Primary | ICD-10-CM

## 2023-07-02 PROCEDURE — 99213 OFFICE O/P EST LOW 20 MIN: CPT | Mod: ,,,

## 2023-07-02 PROCEDURE — 99213 PR OFFICE/OUTPT VISIT, EST, LEVL III, 20-29 MIN: ICD-10-PCS | Mod: ,,,

## 2023-07-02 RX ORDER — DICLOFENAC SODIUM 75 MG/1
75 TABLET, DELAYED RELEASE ORAL 2 TIMES DAILY
Qty: 14 TABLET | Refills: 0 | Status: SHIPPED | OUTPATIENT
Start: 2023-07-02 | End: 2023-07-06 | Stop reason: ALTCHOICE

## 2023-07-02 NOTE — PROGRESS NOTES
"Subjective:      Patient ID: Palma Nelson is a 27 y.o. female.    Vitals:  height is 5' 5" (1.651 m) and weight is 93.9 kg (207 lb). Her oral temperature is 98 °F (36.7 °C). Her blood pressure is 131/85 and her pulse is 57 (abnormal). Her respiration is 18 and oxygen saturation is 99%.     Chief Complaint: Injury (Right wrist pain  x4 months ibuprofen mild  )    Patient is a 27-year-old female that presents complaining of right wrist pain x4 months and is currently wearing a right wrist brace.  She states that this is not anything new and has already gotten 2 x-rays which were negative.  Chart review she was seen in 1 of our clinics in February for similar complaints.     Injury  Associated symptoms include arthralgias.     Musculoskeletal:  Positive for joint pain.    Objective:     Physical Exam   Constitutional: She is oriented to person, place, and time.   Cardiovascular: Normal rate and normal pulses.   Pulmonary/Chest: Effort normal.   Abdominal: Normal appearance.   Musculoskeletal:      Right wrist: She exhibits tenderness. She exhibits normal range of motion, no swelling and no deformity.      Comments: Positive Tinel sign   Neurological: She is alert and oriented to person, place, and time.   Skin: Capillary refill takes less than 2 seconds.   Psychiatric: Her behavior is normal. Mood, judgment and thought content normal.     Assessment:     1. Right wrist pain        Plan:       Right wrist pain  -     Ambulatory referral/consult to Orthopedics  -     diclofenac (VOLTAREN) 75 MG EC tablet; Take 1 tablet (75 mg total) by mouth 2 (two) times daily. for 7 days  Dispense: 14 tablet; Refill: 0      Appears to be possible carpal tunnel vs de quervains tenosynovitis.    Diclofenac 2 times daily to help pain and inflammation.     We will send information to orthopedic, they should give you a call in the next week to set up an appointment.     Continue to wear wrist brace that you already have as needed.    "

## 2023-07-02 NOTE — PATIENT INSTRUCTIONS
Appears to be possible carpal tunnel vs de quervains tenosynovitis.    Diclofenac 2 times daily to help pain and inflammation.     We will send information to orthopedic, they should give you a call in the next week to set up an appointment.     Continue to wear wrist brace that you already have as needed.

## 2023-07-02 NOTE — LETTER
July 2, 2023      [facility]  [address]  [place] [address]  Phone: [phone]       Patient: Palma Nelson   YOB: 1995  Date of Visit: 07/02/2023    To Whom It May Concern:    SHERYL Nelson  was at [facility] on 07/02/2023. The patient may return to work/school on 07/05/2023 no restrictions. If you have any questions or concerns, or if I can be of further assistance, please do not hesitate to contact me.    Sincerely,    Alonzo Finney MA

## 2023-07-06 ENCOUNTER — OFFICE VISIT (OUTPATIENT)
Dept: ORTHOPEDICS | Facility: CLINIC | Age: 28
End: 2023-07-06
Payer: COMMERCIAL

## 2023-07-06 VITALS — BODY MASS INDEX: 34.49 KG/M2 | HEIGHT: 65 IN | WEIGHT: 207 LBS

## 2023-07-06 DIAGNOSIS — M25.531 RIGHT WRIST PAIN: ICD-10-CM

## 2023-07-06 DIAGNOSIS — G89.29 CHRONIC PAIN OF RIGHT WRIST: Primary | ICD-10-CM

## 2023-07-06 DIAGNOSIS — M25.531 CHRONIC PAIN OF RIGHT WRIST: Primary | ICD-10-CM

## 2023-07-06 PROCEDURE — 3008F BODY MASS INDEX DOCD: CPT | Mod: CPTII,,, | Performed by: PHYSICIAN ASSISTANT

## 2023-07-06 PROCEDURE — 3008F PR BODY MASS INDEX (BMI) DOCUMENTED: ICD-10-PCS | Mod: CPTII,,, | Performed by: PHYSICIAN ASSISTANT

## 2023-07-06 PROCEDURE — 1160F PR REVIEW ALL MEDS BY PRESCRIBER/CLIN PHARMACIST DOCUMENTED: ICD-10-PCS | Mod: CPTII,,, | Performed by: PHYSICIAN ASSISTANT

## 2023-07-06 PROCEDURE — 1159F MED LIST DOCD IN RCRD: CPT | Mod: CPTII,,, | Performed by: PHYSICIAN ASSISTANT

## 2023-07-06 PROCEDURE — 99203 OFFICE O/P NEW LOW 30 MIN: CPT | Mod: ,,, | Performed by: PHYSICIAN ASSISTANT

## 2023-07-06 PROCEDURE — 1159F PR MEDICATION LIST DOCUMENTED IN MEDICAL RECORD: ICD-10-PCS | Mod: CPTII,,, | Performed by: PHYSICIAN ASSISTANT

## 2023-07-06 PROCEDURE — 99203 PR OFFICE/OUTPT VISIT, NEW, LEVL III, 30-44 MIN: ICD-10-PCS | Mod: ,,, | Performed by: PHYSICIAN ASSISTANT

## 2023-07-06 PROCEDURE — 1160F RVW MEDS BY RX/DR IN RCRD: CPT | Mod: CPTII,,, | Performed by: PHYSICIAN ASSISTANT

## 2023-07-06 RX ORDER — MELOXICAM 15 MG/1
15 TABLET ORAL DAILY
Qty: 30 TABLET | Refills: 1 | Status: SHIPPED | OUTPATIENT
Start: 2023-07-06 | End: 2024-02-06

## 2023-07-07 NOTE — PROGRESS NOTES
"Chief Complaint:   Chief Complaint   Patient presents with    Right Wrist - Pain     RT wrist pain that initially started 1 year ago but got worse a few months ago. LT hand dominant. Complaints of pain and swelling. ROM is limited. Tried ice, elevation, OTC meds but no relief.        History of present illness:    This is a 27 y.o. year old female who complains of  right wrist pain.    Patient states that she is had a proximally 2 falls in the last 1 was in February of this year she did go to an urgent care clinic and had x-rays which were negative for fracture.    She states he is still having pain she points to the radial side of her right wrist at the wrist joint.    She states  that she is having continued pain especially with certain movements and she is demonstrates wrist extension.    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    Denies numbness or tingling    Review of Systems:    Constitution:   Denies chills, fever, and sweats.  HENT:   Denies headaches or blurry vision.  Cardiovascular:  Denies chest pain or irregular heart beat.  Respiratory:   Denies cough or shortness of breath.  Gastrointestinal:  Denies abdominal pain, nausea, or vomiting.  Musculoskeletal:   Denies muscle cramps.  Neurological:   Denies dizziness or focal weakness.  Psychiatric/Behavior: Normal mental status.  Hematology/Lymph:  Denies bleeding problem or easy bruising/bleeding.  Skin:    Denies rash or suspicious lesions.    Examination:    Vital Signs:    Vitals:    07/06/23 1335   Weight: 93.9 kg (207 lb)   Height: 5' 5" (1.651 m)   PainSc:   4       Body mass index is 34.45 kg/m².    Constitution:   Well-developed, well nourished patient in no acute distress.  Neurological:   Alert and oriented x 3 and cooperative to examination.     Psychiatric/Behavior: Normal mental status.  Respiratory:   No shortness of breath.  Eyes:    Extraoccular muscles intact  Skin:    No " scars, rash or suspicious lesions.    Physical Exam:    Right wrist exam  No obvious deformity.  Negative tenderness over distal radius.  Supination and pronation to 90 degrees and 90 degrees, respectively.  Wrist flexion to 90 degrees and wrist extension to 70 degree.  Negative Tinel's test.  Negative Finkelstein's test.  5/5 strength, normal skin appearance and palpable pulses   Tender to palpation over the anatomic snuffbox      Imaging: X-rays  reviewed from urgent Care  performed in February of 2023  There is no acute osseous abnormalities noted        Assessment: Chronic pain of right wrist    Right wrist pain  -     MRI Wrist Joint Without Contrast Right; Future; Expected date: 07/07/2023    Other orders  -     meloxicam (MOBIC) 15 MG tablet; Take 1 tablet (15 mg total) by mouth once daily. Take with food  Dispense: 30 tablet; Refill: 1         Plan:   since the patient is having continued  wrist pain in his affecting her job duties.    We will put her on anti-inflammatory and order an MRI to make sure there is no other abnormality or any other possible injury to the scaphoid area as this seems to be her most tender point on palpation.    She will follow up in 2 weeks with our hand specialist after obtaining the MRI          DISCLAIMER: This note may have been dictated using voice recognition software and may contain grammatical errors.     NOTE: Consult report sent to referring provider via [facility] EMR.

## 2023-07-25 ENCOUNTER — OFFICE VISIT (OUTPATIENT)
Dept: URGENT CARE | Facility: CLINIC | Age: 28
End: 2023-07-25
Payer: COMMERCIAL

## 2023-07-25 VITALS
HEART RATE: 65 BPM | OXYGEN SATURATION: 98 % | DIASTOLIC BLOOD PRESSURE: 77 MMHG | BODY MASS INDEX: 34.49 KG/M2 | SYSTOLIC BLOOD PRESSURE: 116 MMHG | TEMPERATURE: 98 F | HEIGHT: 65 IN | WEIGHT: 207 LBS

## 2023-07-25 DIAGNOSIS — J01.40 ACUTE NON-RECURRENT PANSINUSITIS: Primary | ICD-10-CM

## 2023-07-25 DIAGNOSIS — J02.9 SORE THROAT: ICD-10-CM

## 2023-07-25 LAB
CTP QC/QA: YES
CTP QC/QA: YES
MOLECULAR STREP A: NEGATIVE
SARS-COV-2 RDRP RESP QL NAA+PROBE: NEGATIVE

## 2023-07-25 PROCEDURE — 96372 THER/PROPH/DIAG INJ SC/IM: CPT | Mod: ,,, | Performed by: FAMILY MEDICINE

## 2023-07-25 PROCEDURE — 87651 STREP A DNA AMP PROBE: CPT | Mod: QW,,, | Performed by: FAMILY MEDICINE

## 2023-07-25 PROCEDURE — 87635 SARS-COV-2 COVID-19 AMP PRB: CPT | Mod: QW,,, | Performed by: FAMILY MEDICINE

## 2023-07-25 PROCEDURE — 99214 PR OFFICE/OUTPT VISIT, EST, LEVL IV, 30-39 MIN: ICD-10-PCS | Mod: 25,,, | Performed by: FAMILY MEDICINE

## 2023-07-25 PROCEDURE — 99214 OFFICE O/P EST MOD 30 MIN: CPT | Mod: 25,,, | Performed by: FAMILY MEDICINE

## 2023-07-25 PROCEDURE — 96372 PR INJECTION,THERAP/PROPH/DIAG2ST, IM OR SUBCUT: ICD-10-PCS | Mod: ,,, | Performed by: FAMILY MEDICINE

## 2023-07-25 PROCEDURE — 87651 POCT STREP A MOLECULAR: ICD-10-PCS | Mod: QW,,, | Performed by: FAMILY MEDICINE

## 2023-07-25 PROCEDURE — 87635: ICD-10-PCS | Mod: QW,,, | Performed by: FAMILY MEDICINE

## 2023-07-25 RX ORDER — AMOXICILLIN AND CLAVULANATE POTASSIUM 875; 125 MG/1; MG/1
1 TABLET, FILM COATED ORAL EVERY 12 HOURS
Qty: 14 TABLET | Refills: 0 | Status: SHIPPED | OUTPATIENT
Start: 2023-07-25 | End: 2023-08-01

## 2023-07-25 RX ORDER — DEXAMETHASONE SODIUM PHOSPHATE 100 MG/10ML
5 INJECTION INTRAMUSCULAR; INTRAVENOUS
Status: COMPLETED | OUTPATIENT
Start: 2023-07-25 | End: 2023-07-25

## 2023-07-25 RX ADMIN — DEXAMETHASONE SODIUM PHOSPHATE 5 MG: 100 INJECTION INTRAMUSCULAR; INTRAVENOUS at 11:07

## 2023-07-25 NOTE — PROGRESS NOTES
"Subjective:      Patient ID: Palma Nelson is a 28 y.o. female.    Vitals:  height is 5' 5" (1.651 m) and weight is 93.9 kg (207 lb). Her temperature is 98.2 °F (36.8 °C). Her blood pressure is 116/77 and her pulse is 65. Her oxygen saturation is 98%.     Chief Complaint: Nasal Congestion (Cough, body aches, chills, sore throat, right ear pain and feels stopped up, nasal congestion. Started Wednesday )    6 days of cough, congestion, pharyngitis, laryngitis. No fever, no SOB or CP.        Constitution: Negative for chills, fatigue and fever.   HENT:  Positive for postnasal drip. Negative for congestion, sinus pressure and trouble swallowing.    Neck: Negative for neck pain and neck stiffness.   Cardiovascular:  Negative for chest pain, leg swelling and sob on exertion.   Respiratory:  Positive for cough. Negative for chest tightness, shortness of breath and wheezing.    Neurological:  Negative for dizziness, disorientation and altered mental status.   Psychiatric/Behavioral:  Negative for altered mental status and disorientation.     Objective:     Physical Exam   Constitutional: She is oriented to person, place, and time. She appears well-developed.  Non-toxic appearance. No distress.   HENT:   Head: Normocephalic.   Ears:   Right Ear: Tympanic membrane and external ear normal.   Left Ear: Tympanic membrane and external ear normal.   Nose: Rhinorrhea present.   Mouth/Throat: Uvula is midline and mucous membranes are normal. No uvula swelling. Posterior oropharyngeal erythema and cobblestoning present. No oropharyngeal exudate or posterior oropharyngeal edema. Tonsils are 0 on the right. Tonsils are 0 on the left. No tonsillar exudate.   Eyes: Pupils are equal, round, and reactive to light. Right eye exhibits no discharge. Left eye exhibits no discharge.   Neck: Neck supple. No tracheal deviation present.   Cardiovascular: Normal rate, regular rhythm and normal heart sounds.   No murmur " heard.  Pulmonary/Chest: Effort normal and breath sounds normal. No stridor. No respiratory distress. She has no wheezes.   Lymphadenopathy:     She has no cervical adenopathy.   Neurological: no focal deficit. She is alert and oriented to person, place, and time.   Skin: Skin is warm and dry.   Psychiatric: Thought content normal.   Nursing note and vitals reviewed.    Assessment:     1. Acute non-recurrent pansinusitis    2. Sore throat        Plan:       Acute non-recurrent pansinusitis  -     dexAMETHasone injection 5 mg  -     amoxicillin-clavulanate 875-125mg (AUGMENTIN) 875-125 mg per tablet; Take 1 tablet by mouth every 12 (twelve) hours. for 7 days  Dispense: 14 tablet; Refill: 0    Sore throat  -     POCT COVID-19 Rapid Screening  -     POCT Strep A, Molecular           COVID test negative.   Strep test negative.

## 2023-07-25 NOTE — PATIENT INSTRUCTIONS
Flonase and saline nasal spray twice a day, antihistamine at bedtime.  Force fluids.  Augmentin with food. Decadron injection given today, discussed side effects.  Cough may linger a few weeks but should not have fever, chest pain, or shortness of breath.

## 2024-02-06 PROBLEM — Z86.79 HISTORY OF SUPRAVENTRICULAR TACHYCARDIA: Status: ACTIVE | Noted: 2024-02-06

## 2024-10-27 ENCOUNTER — OFFICE VISIT (OUTPATIENT)
Dept: URGENT CARE | Facility: CLINIC | Age: 29
End: 2024-10-27
Payer: COMMERCIAL

## 2024-10-27 VITALS
TEMPERATURE: 98 F | DIASTOLIC BLOOD PRESSURE: 69 MMHG | SYSTOLIC BLOOD PRESSURE: 108 MMHG | HEART RATE: 90 BPM | BODY MASS INDEX: 35.82 KG/M2 | WEIGHT: 215 LBS | RESPIRATION RATE: 16 BRPM | OXYGEN SATURATION: 99 % | HEIGHT: 65 IN

## 2024-10-27 DIAGNOSIS — J11.1 INFLUENZA: Primary | ICD-10-CM

## 2024-10-27 DIAGNOSIS — R52 BODY ACHES: ICD-10-CM

## 2024-10-27 LAB
CTP QC/QA: YES
CTP QC/QA: YES
POC MOLECULAR INFLUENZA A AGN: POSITIVE
POC MOLECULAR INFLUENZA B AGN: NEGATIVE
SARS-COV-2 AG RESP QL IA.RAPID: NEGATIVE

## 2024-10-27 PROCEDURE — 87502 INFLUENZA DNA AMP PROBE: CPT | Mod: QW,,,

## 2024-10-27 PROCEDURE — 87811 SARS-COV-2 COVID19 W/OPTIC: CPT | Mod: QW,,,

## 2024-10-27 PROCEDURE — 99213 OFFICE O/P EST LOW 20 MIN: CPT | Mod: ,,,
